# Patient Record
Sex: MALE | Race: WHITE
[De-identification: names, ages, dates, MRNs, and addresses within clinical notes are randomized per-mention and may not be internally consistent; named-entity substitution may affect disease eponyms.]

---

## 2022-02-14 NOTE — NUR
PATIENT ARRIVED TO  UNIT  FROM ED AWAKE,ALERT AND ORIENTED TIMES THREE.
PATIENT REMAIN NPO. DR. WRIGHT ROUND ON PATIENT AND DISCUSS PLAN OF CARE. PATIENT
IS AFIB AT A RATE AT 90.NO ACUTE DISTRESS NOTED.PATIENT ENCOURAGE TO USE  CALL
TOM FOR  ASSISTANCE.

## 2022-02-14 NOTE — NUR
MADE AWARE  THAT  PATIENT  IS AFIB ON THE  MONITOR,NO NEW ORDER
RECIEVED. MD ADVISED NURSE TO NOTIFY MD IF RATE INCREASE.

## 2022-02-14 NOTE — NUR
PATIENT ARRIVED TO UNIT FROM ED AT 1250 AWAKE,ALERT AND ORIENTED TIMES
THREE.DENIES PAIN.   MADE AWRE OF PATIENT  ABDOMINAL CT AND NEW ORDER
RECIEVED FOR SURGERY CONSULT  AND KEEP PATIENT NPO. DR. KENDRICK STATES THAT
SHE WILL MAKE CHANGE TO PATIENT ANTIBIOTIC TREATMENT.

## 2022-02-15 NOTE — NUR
SHIFT SUMMARY
PATIENT ALERT WITH SOME FORGETFULLNESS ABLE TO VOICE NEEDS PATIENT NOTED WITH
SINUS TACH AND MULTIPLE PVCS HR  TEMP 102 DR NUÑEZ MADE AWARE TYLENOL
ADM WITH RELIEF TEMP 98.8.PT CALM PLEASANT NO S/S OF DISTRESS CONT ZOSYN Q
6.PT IS NPO FOR PROCEDURE TODAY ABDOMINAL DISTENDED WITH HYPO ACTIVE BOWEL
SOUNDS SOLO INTACT DRAINING DARK BROWN URINE ON CONT NS RUNNING AT 100ML/HR.

## 2022-02-15 NOTE — NUR
02/15/22 1244 Abhishek Cortes
PATIENT ARRIVED TO OR WITH SOLO CATH IN DRAINING DARK VALERIE URINE AND
ON SCHEDULED ANTIBIOTICS
 
TAP BLACK DONE INTRA OP PER DR. NIX

## 2022-02-15 NOTE — NUR
PT. TO ROOM #208 AT 1820. SLEEPY BUT AROUSABLE. DENIES PAIN, ABDOMEN IS TENDER
TO TOUCH. O2 UP TO 3.5L AT THIS TIME , PER NC, TO KEEP SATS ABOVE 90%, AT
THIS TIME IS 91%. .IVF AT TKO AT THIS TIME. VSS. TELEMETRY APPLIED, NSR 68.
EDIL TO GRAVITY, PATENT.

## 2022-02-15 NOTE — NUR
1625 ASSUMED CARE DROWSY DOES OPEN EYES  TO VERBAL STIMULI  AND COUGHED WHEN I
ASKED HIM TO. LUNGS DO SOUND MUCH BETTER  RESP A LITTLE LABORED  NO FACIAL
GRIMACE

## 2022-02-16 NOTE — NUR
SHIFT SUMMARY
POD 1 LAP COLECTOMY, X4 INCISIONS W/DERMABOND, C/D/I. MILD DISTENTION TO ABD,
HYPO BT. DENIES FLATUS. A&O X 4, VSS, AFEBRILE, SATS >92% ON RA, [place]. LOW TO
ZERO PO INTAKE. ZOFRAN GIVEN X1 FOR N/V. SOLO IN PLACE, DRAINING CLEAR YELLOW
URINE, STAT LOCK IN PLACE, BAG OFF FLOOR. PAIN MANAGED W/ TYLENOL AND 5MG
OXYCODONE. WILL REPORT TO ONCOMING RN.

## 2022-02-16 NOTE — NUR
PT EDUCATED ON CALL LIGHT USE AND SAFETY MEASURES. PT STATES HE IS "STUBBORN
AND WILL DO IT WHEN I AM READY", "WILL DO IT ON MY OWN". REINFORCED IMPORTANCE
OF LETTING US HELP TO PREVENT FALLS AND PROVIDE SAFETY FOR HIM.

## 2022-02-16 NOTE — NUR
POD 1 S/P COLECTOMY. SATS >90% ON 3LO2, PT EDUCATED ON TCDB AND I/S USE. PT
HAD T-MAX .8, RESOLVED W/TYLENOL AND NON PHARM COOLING MEASURES.
INCISIONS CDI. ABD REMAINS DISTENDED AND GAURDED. BT HYPO, PT REP NO FLATUS
YET. PT JHOANA CL PO, DENIED N/V. SOLO PATANT PATANT DRNG TEA COLORED URINE.
PAIN MGD PER EMAR. PT REPOSITIONED AS JHOANA, DOES SHIFT SELF IN BED. REPORT
GIVEN TO RENATA SONG RN.

## 2022-02-17 NOTE — NUR
RECEIVED CALL FROM TELE REPORTING ST ELEVATION IN LEAD 2 AND AV AND MONICA
RATE, MULTIPLE PAIR PVCS. EKG DONE: NSR 68 BPM WITH PACS & PVCS. HOSPITALIST
CALLED WITH RESULTS, LEFT MESSAGE TO CALL SURGICAL FLOOR.

## 2022-02-17 NOTE — NUR
TELEPHONE CALL WITH WIFE KIKO, SHE WILL BE HERE FOR CARDIOLOGIST MEETING IN
PATIENT'S ROOM AT 12 NOON.

## 2022-02-17 NOTE — NUR
Spiritual Care Visit
Based on referral from floor nurse. Pt. was in bed and alert. While cautious
at first pt. welcomed my visit. Pt. is unsettled by the circumstances of our
world. Pt. verbalizes that those circumstances make him ready to pass from
this earth.  Listen empathetically.  Establish rapport. Pt. displays evidence
of being ini pain. Provide a calming presence. Facilitated a life review,
particulalry about matters of [place] and belief. Offered emotional support.
With permission prayed for pt. Pt. displayed evidence of receptivity, and
verbalized gratitude for the spiritual care visit and pastoral prayer.

## 2022-02-17 NOTE — NUR
TELEPHONE CALL WITH HOSPITALISTPAMELA ON FRONT OF CHART CHANGING CODE STATUS
TO DNR, NEEDS DOCTOR SIGNATURE AND NEED TO CHANGE CODE STATUS IN COMPUTER. 
SAID TO LEAVE PAMELA ON FRONT OF CHART AND WILL COME BY TO SIGN, AND OK TO
CHANGE CODE STATUS TO DNR IN COMPUTER CHART.

## 2022-02-17 NOTE — NUR
POD 2 S/P COLECTOMY. PT VSS T/O NIGHT. 1-2L O2 IN PLACE WHILE PT SLEEPING. PT
HAD >10 EPISODES OF EMESIS. DR VIDAL NOTIFIED, ORDER OBTAINED FOR NGT EARLY IN
SHIFT, PT HESITANT, BUT FINALLY AGREED AROUND 0300 THIS AM. NGT PLACED TO LIS
W/TOTAL OF 1600ML BILE DRNG OUTPUT SINCE DRAIN PLACED. PT HAD 1 SOFT BM,
PASSED SMALL AMT FLATUS. ABD REMAINS DISTENDED, SOFTER AND LESS GUARDED TO
PALP. SOLO DRAINING LIGHTER YELLOW URINE. PAIN MGD PER EMAR. PT UP OOB W/1-2
ASSIST, IS WEAK WHEN UP.

## 2022-02-17 NOTE — NUR
Made a visit to pt's room today. He is pleasant, alert and oriented. His
nickname is "Cal". He hasn't been to the doctor in many years, and takes no
medication. He considers himself generally healthy. He recently had a GLF, and
after coming to ED and was found to have a perforated bowel causing multiple
symptoms.
 Patient is very clear, he does not want any CPR or Intubation. He also would
not want a permanent PEG tube. I also reviewed his choices with his wife
Natasha via t/c.
  POLST to be signed and placed in front of hard chart. Will obtain copy for
medical records after MD signature. Original to go home with patient.

## 2022-02-17 NOTE — NUR
PT CONT TO HAVE ONGOING N/V. PT HAD MULTIPLE EPISODES OF UNMEASURED BILE
COLORED EMESIS W/NO SIG RELIEF W/PRN NAUSEA MEDS. NGT PLACED W/BILE COLORED
DRNG. TUBE PLACED TO LIS. PT EDUCATED.

## 2022-02-17 NOTE — NUR
SHIFT SUMMARY
POD9 COLECTOMY, X4 SX SITES W/DERMABOND, WNL. MILD ABD DISTENTION, TENDER TO
PALP. NG TUBE IN PLACE WITH LOW INT SUCT., GREEN FLUID DRNG, FLUSHED FREQ T/O
DAY. PT DENIES N/V AT THIS TIME, DENIES FLATUS. SOLO IN PLACE, STAT LOCK ON &
OFF FLOOR, DRNG CLEAR YELLOW URINE. HIGH BP T/O DAY TREATED PER EMAR, 2L O2
VIA NC FOR PT COMFORT. ELEVATED TROPONIN, CARDIOLOGY CONSULTED - WILL MEET
AGAIN W/WIFE AT BEDSIDE TMRW 2/18 @ NOON. WILL REPORT TO ONCOMING RN.

## 2022-02-18 NOTE — NUR
SUMMARY
PT CONTINUES TO HAVE NG TUBE THAT IS DRAINING GREEN FLUID. PT REPORTS DECREASE
IN EPISODES OF PAIN. PT HAS BEEN ABLE TO TRANSFER WITH ASSISTANCE TO BSC AND
HAVE A BM. PT TROPINS HAVE BEEN DRAWN AND ARE TRENDING DOWN. PT HAS DENIED
CHEST PAIN/ PRESSURE OR SOB. PT HAS BEEN  ABLE TO SLEEP DURING SHIFT. PT
CURRENTLY RESTING IN NO DISTRESS, CALL LIGHT IN REACH.

## 2022-02-19 NOTE — NUR
ASSUMED PT. CARE AT THIS TIME. PATIENT DENIES ANY DISCOMFORT OR NEEDS. DID
JUST HAVE SHOWER WITH ASSIST AND TOLERATE WELL. SITTING UP IN CHAIR WITH WIFE
VISITING AT BEDSIDE. ENCOURAGED TO USE CALL LIGHT FOR ANY NEEDS, CONCERNS,
COMPLAINTS.

## 2022-02-19 NOTE — NUR
CALLED ON CALL HOSPITALIST WITH RESULT FROM TELE TECH THAT PATIENT HAD A RUN
OF SVT WITH HR 'S, HE WANTED A CHEM PANEL DONE BUT I LET HIM KNOW THAT
HE HAD JUST HAD THEM DONE, HE STATED HE WOULD TAKE A LOOK AND GO FROM THERE.
WILL CONTINUE TO MONITOR.

## 2022-02-19 NOTE — NUR
SHIFT SUMMARY
A/O X3. BEDREST THIS SHIFT. POD4 COLECTOMY, LAP SITES WITH DERMABOND C/D/I.
VITAL SIGNS STABLE. TOLERATING SIPS AND CHIPS. SOLO DRAINING TO GRAVITY, DARK
VALERIE/REDDISH IN COLOR. PAIN REPORTED IN THE BEGINING OF THE SHIFT AND TREATED
PER EMAR, NO MORE REPORTS OF PAIN AT THIS TIME. TELE IN PLACE. WILL CONTINUE
TO MONITOR AND REPORT TO ONCOMING RN.

## 2022-02-19 NOTE — NUR
VITALS TAKEN THE CALL PLACED TO THE HOSPITALIST ABOUT PATIENT HAVING A RUN OF
SVT'S WITH HR IN 'S, HE WOULD LIKE A CHEM PANEL ORDERED AT THIS TIME.
WILL CONTINUE TO MONITOR.

## 2022-02-20 NOTE — NUR
SHIFT SUMMARY
PT IS POD#4 FROM A COLECTOMY WITH DR. WRIGHT.  PT IS PASSING STOOL.  HE DOES HAVE
BLOOD IN HIS URINE, DR. WRIGHT AND DR. CAMARA ARE AWARE.  PT IS INDEPENDENT IN
HIS ROOM.  PAIN MANAGED WITH TYLENOL.  PT'S WIFE VISITED TODAY.  PLAN FOR
POSSIBLE DC HOME TOMORROW.  WILL CONTINUE TO MONITOR.

## 2022-02-20 NOTE — NUR
DR. CAMARA ROUNDED AT 1123.  DISCUSSED HEMATURIA AND BLOOD IN TOILET AFTER PT
HAD A BM.  STOOL DID NOT APPEAR BLACK OR TARRY.  PLAN FOR DISCHARGE HOME
TOMORROW PER DR. CAMARA.  WILL CONTINUE TO MONITOR.

## 2022-02-20 NOTE — NUR
PT UPSET WHEN AWAKENED FOR VS THIS AM.  HE REPORTS HE DID NOT GET ANY SLEEP
DURING THE NIGHT.  PLAN TO ALLOW PT TO SLEEP UNDISTURBED UNTIL APROXIMATELY
1000 UNLESS ASSISTANCE IS NEEDED.  WILL CONTINUE TO MONITOR.

## 2022-02-20 NOTE — NUR
SHIFT SUMMARY
A/O X4- POD4 LAP COLECTOMY- LAP SITES JESSIE WITH DERMABOND INTACT. VOIDING AND
HAVING BOWEL MOVEMENTS, TOLERATING DIET. NO N/V NOTED. TREATED FOR PAIN WITH
PO PAIN MEDICATION. VITAL SIGNS STABLE. ON TELE: REPORT OF SR WITH BBB, PAC,
AND PVC. AMBULATING WITH FWW TO BATHROOM. WILL CONTINUE TO MONITOR AND REPORT
TO ONCOMING RN.

## 2022-02-21 NOTE — NUR
DISCHARGE SUMMARY
DISCUSSED DISCHARGE INFORMATION c THE PATIEN AND HIS WIFE PER HIS REQUEST TO
INCLUDE HER. GENERAL CONTACT INFORMATION GIVEN AND PATIENT ENCOURAGE TO CALL
PROVIDED PHONE NUMBERS SHOULD QUESTIONS ARISE AFTER THEY HAVE LEFT. DISCHARGE
MEDICATIONS DISCUSSED. NO IV ACCESS DEVICES IN PLACE AT TIME OF DISCHARGE.
PATIENT AND HIS WIFE DENY HAVING ANY QUESTIONS AFTER DISCUSSING INSTRUCTIONS.
ESCORTED OUT BY [facility] TO [employer] TO GO HOME.

## 2023-08-17 ENCOUNTER — HOSPITAL ENCOUNTER (OUTPATIENT)
Dept: HOSPITAL 95 - ORSCSDS | Age: 83
Discharge: HOME | End: 2023-08-17
Attending: OPHTHALMOLOGY
Payer: MEDICARE

## 2023-08-17 VITALS — DIASTOLIC BLOOD PRESSURE: 81 MMHG | SYSTOLIC BLOOD PRESSURE: 142 MMHG

## 2023-08-17 VITALS — HEIGHT: 65 IN | BODY MASS INDEX: 34.23 KG/M2 | WEIGHT: 205.47 LBS

## 2023-08-17 DIAGNOSIS — E11.36: Primary | ICD-10-CM

## 2023-08-17 DIAGNOSIS — I25.2: ICD-10-CM

## 2023-08-17 DIAGNOSIS — H25.12: ICD-10-CM

## 2023-08-17 DIAGNOSIS — Z79.899: ICD-10-CM

## 2023-08-17 DIAGNOSIS — Z79.82: ICD-10-CM

## 2023-08-17 DIAGNOSIS — I10: ICD-10-CM

## 2023-08-17 DIAGNOSIS — E66.9: ICD-10-CM

## 2023-08-17 DIAGNOSIS — Z86.73: ICD-10-CM

## 2023-08-17 PROCEDURE — V2632 POST CHMBR INTRAOCULAR LENS: HCPCS

## 2023-08-17 PROCEDURE — 08RK3JZ REPLACEMENT OF LEFT LENS WITH SYNTHETIC SUBSTITUTE, PERCUTANEOUS APPROACH: ICD-10-PCS | Performed by: OPHTHALMOLOGY

## 2023-08-17 NOTE — NUR
08/17/23 1041 Lauryn Zazueta
TETRACAINE DROP IN LEFT EYE PLACED AT 1031
PLEDGET PLACED IN LEFT EYE AT 1032 PATIENT TOLERATED WELL.

## 2023-08-17 NOTE — NUR
08/17/23 1156 Jenniffer Springer
IV REMOVED CANNULA INTACT, PT TOLERATED WELL. PT DENIES NAUSEA AND
PAIN

## 2023-08-24 ENCOUNTER — HOSPITAL ENCOUNTER (OUTPATIENT)
Dept: HOSPITAL 95 - ORSCSDS | Age: 83
Discharge: HOME | End: 2023-08-24
Attending: OPHTHALMOLOGY
Payer: MEDICARE

## 2023-08-24 VITALS — BODY MASS INDEX: 33.61 KG/M2 | HEIGHT: 65 IN | WEIGHT: 201.72 LBS

## 2023-08-24 VITALS — SYSTOLIC BLOOD PRESSURE: 138 MMHG | DIASTOLIC BLOOD PRESSURE: 94 MMHG

## 2023-08-24 DIAGNOSIS — H25.11: ICD-10-CM

## 2023-08-24 DIAGNOSIS — E11.36: Primary | ICD-10-CM

## 2023-08-24 DIAGNOSIS — Z85.038: ICD-10-CM

## 2023-08-24 DIAGNOSIS — I10: ICD-10-CM

## 2023-08-24 DIAGNOSIS — Z79.899: ICD-10-CM

## 2023-08-24 DIAGNOSIS — E66.9: ICD-10-CM

## 2023-08-24 DIAGNOSIS — I25.2: ICD-10-CM

## 2023-08-24 DIAGNOSIS — Z96.1: ICD-10-CM

## 2023-08-24 PROCEDURE — 08RJ3JZ REPLACEMENT OF RIGHT LENS WITH SYNTHETIC SUBSTITUTE, PERCUTANEOUS APPROACH: ICD-10-PCS | Performed by: OPHTHALMOLOGY

## 2023-08-24 PROCEDURE — V2632 POST CHMBR INTRAOCULAR LENS: HCPCS

## 2023-08-24 NOTE — NUR
08/24/23 1102 Delfino Couch
CORRECT EYE IDENTIFIED AS RIGHT EYE PER PT AND CONSENT. TETRICANE
PLACED IN RIGHT EYE AT 1048. PLEGET FOAM PLACED IN RIGHT EYE AT 1049.
PT TOLERATED WELL. CALL LIGHT WITHIN REACH.

## 2025-03-03 ENCOUNTER — HOSPITAL ENCOUNTER (OUTPATIENT)
Dept: HOSPITAL 95 - LAB | Age: 85
Discharge: HOME | End: 2025-03-03
Attending: FAMILY MEDICINE
Payer: COMMERCIAL

## 2025-03-03 DIAGNOSIS — I10: Primary | ICD-10-CM

## 2025-03-03 LAB
ALBUMIN SERPL BCP-MCNC: 3.5 G/DL (ref 3.4–5)
ALBUMIN/GLOB SERPL: 0.9 {RATIO} (ref 0.8–1.8)
ALT SERPL W P-5'-P-CCNC: 22 U/L (ref 12–78)
ANION GAP SERPL CALCULATED.4IONS-SCNC: 10 MMOL/L (ref 3–11)
AST SERPL W P-5'-P-CCNC: 15 U/L (ref 12–37)
BASOPHILS # BLD AUTO: 0.03 K/MM3 (ref 0–0.23)
BASOPHILS NFR BLD AUTO: 0 % (ref 0–2)
BILIRUB SERPL-MCNC: 0.4 MG/DL (ref 0.1–1)
BUN SERPL-MCNC: 15 MG/DL (ref 8–24)
CALCIUM SERPL-MCNC: 9.3 MG/DL (ref 8.5–10.1)
CHLORIDE SERPL-SCNC: 104 MMOL/L (ref 98–108)
CO2 SERPL-SCNC: 29 MMOL/L (ref 21–32)
CREAT SERPL-MCNC: 0.7 MG/DL (ref 0.6–1.2)
DEPRECATED RDW RBC AUTO: 46.3 FL (ref 35.1–46.3)
EOSINOPHIL # BLD AUTO: 0.25 K/MM3 (ref 0–0.68)
EOSINOPHIL NFR BLD AUTO: 3 % (ref 0–6)
ERYTHROCYTE [DISTWIDTH] IN BLOOD BY AUTOMATED COUNT: 14.1 % (ref 11.7–14.2)
GLOBULIN SER CALC-MCNC: 3.9 G/DL (ref 2.2–4)
GLUCOSE SERPL-MCNC: 108 MG/DL (ref 70–99)
HCT VFR BLD AUTO: 45.6 % (ref 37–53)
HGB BLD-MCNC: 15 G/DL (ref 13.5–17.5)
IMM GRANULOCYTES # BLD AUTO: 0.02 K/MM3 (ref 0–0.1)
IMM GRANULOCYTES NFR BLD AUTO: 0 % (ref 0–1)
LYMPHOCYTES # BLD AUTO: 2.2 K/MM3 (ref 0.84–5.2)
LYMPHOCYTES NFR BLD AUTO: 28 % (ref 21–46)
MCHC RBC AUTO-ENTMCNC: 32.9 G/DL (ref 31.5–36.5)
MCV RBC AUTO: 89 FL (ref 80–100)
MONOCYTES # BLD AUTO: 0.67 K/MM3 (ref 0.16–1.47)
MONOCYTES NFR BLD AUTO: 9 % (ref 4–13)
NEUTROPHILS # BLD AUTO: 4.63 K/MM3 (ref 1.96–9.15)
NEUTROPHILS NFR BLD AUTO: 59 % (ref 41–73)
NRBC # BLD AUTO: 0 K/MM3 (ref 0–0.02)
NRBC BLD AUTO-RTO: 0 /100 WBC (ref 0–0.2)
PLATELET # BLD AUTO: 293 K/MM3 (ref 150–400)
POTASSIUM SERPL-SCNC: 4.3 MMOL/L (ref 3.5–5.5)
PROT SERPL-MCNC: 7.4 G/DL (ref 6.4–8.2)
SODIUM SERPL-SCNC: 139 MMOL/L (ref 136–145)